# Patient Record
Sex: MALE | Race: ASIAN | NOT HISPANIC OR LATINO | URBAN - METROPOLITAN AREA
[De-identification: names, ages, dates, MRNs, and addresses within clinical notes are randomized per-mention and may not be internally consistent; named-entity substitution may affect disease eponyms.]

---
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
## 2024-04-25 ENCOUNTER — EMERGENCY (EMERGENCY)
Facility: HOSPITAL | Age: 24
LOS: 1 days | Discharge: HOME | End: 2024-04-25
Attending: STUDENT IN AN ORGANIZED HEALTH CARE EDUCATION/TRAINING PROGRAM
Payer: MEDICAID

## 2024-04-25 VITALS
HEIGHT: 67 IN | DIASTOLIC BLOOD PRESSURE: 96 MMHG | HEART RATE: 71 BPM | TEMPERATURE: 99 F | WEIGHT: 175.05 LBS | SYSTOLIC BLOOD PRESSURE: 154 MMHG | RESPIRATION RATE: 18 BRPM | OXYGEN SATURATION: 98 %

## 2024-04-25 NOTE — ED ADULT TRIAGE NOTE - CHIEF COMPLAINT QUOTE
toothache starting last night now worsening  taking tylenol, motrin, & orajel w.o relief  states feeling feverish

## 2024-04-26 ENCOUNTER — APPOINTMENT (OUTPATIENT)
Age: 24
End: 2024-04-26
Payer: COMMERCIAL

## 2024-04-26 VITALS
HEART RATE: 64 BPM | OXYGEN SATURATION: 98 % | SYSTOLIC BLOOD PRESSURE: 148 MMHG | TEMPERATURE: 98 F | RESPIRATION RATE: 20 BRPM | DIASTOLIC BLOOD PRESSURE: 83 MMHG

## 2024-04-26 LAB
ALBUMIN SERPL ELPH-MCNC: 5.1 G/DL — HIGH (ref 3.3–5)
ALP SERPL-CCNC: 112 U/L — SIGNIFICANT CHANGE UP (ref 40–120)
ALT FLD-CCNC: 20 U/L — SIGNIFICANT CHANGE UP (ref 10–45)
ANION GAP SERPL CALC-SCNC: 14 MMOL/L — SIGNIFICANT CHANGE UP (ref 5–17)
AST SERPL-CCNC: 15 U/L — SIGNIFICANT CHANGE UP (ref 10–40)
BILIRUB SERPL-MCNC: 0.3 MG/DL — SIGNIFICANT CHANGE UP (ref 0.2–1.2)
BUN SERPL-MCNC: 17 MG/DL — SIGNIFICANT CHANGE UP (ref 7–23)
CALCIUM SERPL-MCNC: 10 MG/DL — SIGNIFICANT CHANGE UP (ref 8.4–10.5)
CHLORIDE SERPL-SCNC: 103 MMOL/L — SIGNIFICANT CHANGE UP (ref 96–108)
CO2 SERPL-SCNC: 21 MMOL/L — LOW (ref 22–31)
CREAT SERPL-MCNC: 0.74 MG/DL — SIGNIFICANT CHANGE UP (ref 0.5–1.3)
EGFR: 132 ML/MIN/1.73M2 — SIGNIFICANT CHANGE UP
GLUCOSE SERPL-MCNC: 104 MG/DL — HIGH (ref 70–99)
HCT VFR BLD CALC: 49.1 % — SIGNIFICANT CHANGE UP (ref 39–50)
HGB BLD-MCNC: 17 G/DL — SIGNIFICANT CHANGE UP (ref 13–17)
MCHC RBC-ENTMCNC: 30.5 PG — SIGNIFICANT CHANGE UP (ref 27–34)
MCHC RBC-ENTMCNC: 34.6 GM/DL — SIGNIFICANT CHANGE UP (ref 32–36)
MCV RBC AUTO: 88 FL — SIGNIFICANT CHANGE UP (ref 80–100)
NRBC # BLD: 0 /100 WBCS — SIGNIFICANT CHANGE UP (ref 0–0)
PLATELET # BLD AUTO: 259 K/UL — SIGNIFICANT CHANGE UP (ref 150–400)
POTASSIUM SERPL-MCNC: 4 MMOL/L — SIGNIFICANT CHANGE UP (ref 3.5–5.3)
POTASSIUM SERPL-SCNC: 4 MMOL/L — SIGNIFICANT CHANGE UP (ref 3.5–5.3)
PROT SERPL-MCNC: 8.2 G/DL — SIGNIFICANT CHANGE UP (ref 6–8.3)
RBC # BLD: 5.58 M/UL — SIGNIFICANT CHANGE UP (ref 4.2–5.8)
RBC # FLD: 11.8 % — SIGNIFICANT CHANGE UP (ref 10.3–14.5)
SODIUM SERPL-SCNC: 138 MMOL/L — SIGNIFICANT CHANGE UP (ref 135–145)
WBC # BLD: 13.73 K/UL — HIGH (ref 3.8–10.5)
WBC # FLD AUTO: 13.73 K/UL — HIGH (ref 3.8–10.5)

## 2024-04-26 RX ORDER — SODIUM CHLORIDE 9 MG/ML
1000 INJECTION INTRAMUSCULAR; INTRAVENOUS; SUBCUTANEOUS ONCE
Refills: 0 | Status: COMPLETED | OUTPATIENT
Start: 2024-04-26 | End: 2024-04-26

## 2024-04-26 RX ORDER — OXYCODONE HYDROCHLORIDE 5 MG/1
5 TABLET ORAL ONCE
Refills: 0 | Status: DISCONTINUED | OUTPATIENT
Start: 2024-04-26 | End: 2024-04-26

## 2024-04-26 RX ORDER — OXYCODONE HYDROCHLORIDE 5 MG/1
1 TABLET ORAL
Qty: 12 | Refills: 0
Start: 2024-04-26 | End: 2024-04-28

## 2024-04-26 RX ORDER — AMPICILLIN SODIUM AND SULBACTAM SODIUM 250; 125 MG/ML; MG/ML
3 INJECTION, POWDER, FOR SUSPENSION INTRAMUSCULAR; INTRAVENOUS EVERY 6 HOURS
Refills: 0 | Status: DISCONTINUED | OUTPATIENT
Start: 2024-04-26 | End: 2024-04-29

## 2024-04-26 RX ORDER — ACETAMINOPHEN 500 MG
975 TABLET ORAL ONCE
Refills: 0 | Status: COMPLETED | OUTPATIENT
Start: 2024-04-26 | End: 2024-04-26

## 2024-04-26 RX ORDER — ACETAMINOPHEN 500 MG
650 TABLET ORAL EVERY 6 HOURS
Refills: 0 | Status: DISCONTINUED | OUTPATIENT
Start: 2024-04-26 | End: 2024-04-29

## 2024-04-26 RX ORDER — MORPHINE SULFATE 50 MG/1
4 CAPSULE, EXTENDED RELEASE ORAL ONCE
Refills: 0 | Status: DISCONTINUED | OUTPATIENT
Start: 2024-04-26 | End: 2024-04-26

## 2024-04-26 RX ORDER — AMPICILLIN SODIUM AND SULBACTAM SODIUM 250; 125 MG/ML; MG/ML
3 INJECTION, POWDER, FOR SUSPENSION INTRAMUSCULAR; INTRAVENOUS ONCE
Refills: 0 | Status: COMPLETED | OUTPATIENT
Start: 2024-04-26 | End: 2024-04-26

## 2024-04-26 RX ORDER — IBUPROFEN 200 MG
600 TABLET ORAL EVERY 6 HOURS
Refills: 0 | Status: DISCONTINUED | OUTPATIENT
Start: 2024-04-26 | End: 2024-04-29

## 2024-04-26 RX ORDER — IBUPROFEN 200 MG
600 TABLET ORAL ONCE
Refills: 0 | Status: COMPLETED | OUTPATIENT
Start: 2024-04-26 | End: 2024-04-26

## 2024-04-26 RX ADMIN — Medication 975 MILLIGRAM(S): at 00:23

## 2024-04-26 RX ADMIN — OXYCODONE HYDROCHLORIDE 5 MILLIGRAM(S): 5 TABLET ORAL at 04:41

## 2024-04-26 RX ADMIN — SODIUM CHLORIDE 100 MILLILITER(S): 9 INJECTION INTRAMUSCULAR; INTRAVENOUS; SUBCUTANEOUS at 05:15

## 2024-04-26 RX ADMIN — SODIUM CHLORIDE 1000 MILLILITER(S): 9 INJECTION INTRAMUSCULAR; INTRAVENOUS; SUBCUTANEOUS at 03:07

## 2024-04-26 RX ADMIN — Medication 600 MILLIGRAM(S): at 08:18

## 2024-04-26 RX ADMIN — AMPICILLIN SODIUM AND SULBACTAM SODIUM 200 GRAM(S): 250; 125 INJECTION, POWDER, FOR SUSPENSION INTRAMUSCULAR; INTRAVENOUS at 08:18

## 2024-04-26 RX ADMIN — MORPHINE SULFATE 4 MILLIGRAM(S): 50 CAPSULE, EXTENDED RELEASE ORAL at 03:01

## 2024-04-26 RX ADMIN — AMPICILLIN SODIUM AND SULBACTAM SODIUM 200 GRAM(S): 250; 125 INJECTION, POWDER, FOR SUSPENSION INTRAMUSCULAR; INTRAVENOUS at 03:11

## 2024-04-26 RX ADMIN — Medication 600 MILLIGRAM(S): at 00:23

## 2024-04-26 NOTE — ED PROVIDER NOTE - CLINICAL SUMMARY MEDICAL DECISION MAKING FREE TEXT BOX
23-year-old male no past medical history presenting to the ED with pain in his lower gums that occurred last night and has been continued associated with chills.  Patient is taking Orajel Tylenol and Motrin continuously with relief the pain comes back.  Denying any nausea, vomiting, fevers or chills.  Exam concerning for fluctuance and tooth.  Will consult dental for Panorex and possible I&D.,

## 2024-04-26 NOTE — ED ADULT NURSE REASSESSMENT NOTE - NSFALLUNIVINTERV_ED_ALL_ED
Bed/Stretcher in lowest position, wheels locked, appropriate side rails in place/Call bell, personal items and telephone in reach/Instruct patient to call for assistance before getting out of bed/chair/stretcher/Non-slip footwear applied when patient is off stretcher/[place] to call system/Physically safe environment - no spills, clutter or unnecessary equipment/Purposeful proactive rounding/Room/bathroom lighting operational, light cord in reach

## 2024-04-26 NOTE — ED ADULT NURSE NOTE - OBJECTIVE STATEMENT
24 yo male no PMH, A&ox3, presents to ED c/o toothache.  Pt reports onset of pain was last night, worsening tonight no relief w/ orajel/tylenol/motrin.  Pt reports it is two front teeth, started feeling chills.  Breathing even and unlabored, abdomen soft nontender, no bleeding/visible purulent drainage.  Pt denies chest pain, palpitations, shortness of breath, headache, visual disturbances, numbness/tingling,, diaphoresis,  nausea, vomiting, constipation, diarrhea, or urinary symptoms.

## 2024-04-26 NOTE — ED CDU PROVIDER DISPOSITION NOTE - CLINICAL COURSE
23-year-old male no past medical history presenting to the ED with pain in his lower gums/tooth pain for the last few days. States that he felt like he has also been having a fever. Patient has been using Orajel, Tylenol, and Motrin for pain.  Denying any nausea, vomiting.  ED course: Afebrile, WBC 13.73. I+D performed by Dental. Started on Unasyn. Plan for continued antibiotics, pain control, with dental to reassess in CDU. 23-year-old male no past medical history presenting to the ED with pain in his lower gums/tooth pain for the last few days. States that he felt like he has also been having a fever. Patient has been using Orajel, Tylenol, and Motrin for pain.  Denying any nausea, vomiting.  ED course: Afebrile, WBC 13.73. I+D performed by Dental. Started on Unasyn. Plan for continued antibiotics and pain control. symptoms improved. pt was discharged to followup with dental clinic.

## 2024-04-26 NOTE — ED PROVIDER NOTE - PROGRESS NOTE DETAILS
Lawrence Moore, PGY3  patient was seen by dental  and had I&D performed.  They would like to start antibiotics keep an obvious and will reassess in the morning.

## 2024-04-26 NOTE — ED CDU PROVIDER DISPOSITION NOTE - ATTENDING APP SHARED VISIT CONTRIBUTION OF CARE
23-year-old male who presents to the emergency department with dental abscess that was drained patient was placed in observation for IV antibiotics he has fever.  no facial swelling, pt will need dental following up case discussed with dental they will see him in the office today patient to go home with pain medication and oral antibiotics patient able to eat breakfast

## 2024-04-26 NOTE — ED CDU PROVIDER INITIAL DAY NOTE - ATTENDING APP SHARED VISIT CONTRIBUTION OF CARE
Alonso Akbar DO: I have personally performed a face to face medical and diagnostic evaluation of the patient. I have discussed with and reviewed the Resident's and/or ACP's and/or Medical/PA/NP student's note and agree with the History, ROS, Physical Exam and MDM unless otherwise indicated. A brief summary of my personal evaluation and impression can be found below.     Physical exam, HPI per prior ED Provider note    MDM as above unless otherwise specified

## 2024-04-26 NOTE — ED ADULT NURSE NOTE - NSFALLUNIVINTERV_ED_ALL_ED
Bed/Stretcher in lowest position, wheels locked, appropriate side rails in place/Call bell, personal items and telephone in reach/Instruct patient to call for assistance before getting out of bed/chair/stretcher/Non-slip footwear applied when patient is off stretcher/[place] to call system/Physically safe environment - no spills, clutter or unnecessary equipment/Purposeful proactive rounding/Room/bathroom lighting operational, light cord in reach

## 2024-04-26 NOTE — ED CDU PROVIDER DISPOSITION NOTE - NSFOLLOWUPINSTRUCTIONS_ED_ALL_ED_FT
Hydrate.     Please take Tylenol 650mg and/or Motrin 600mg every 6 hours as needed for pain. For breakthrough/severe pain you can take Oxycodone 5mg every 6 hours. THIS MEDICATION CAN MAKE YOU DROWSY, PLEASE DO NOT DRIVE ON THIS MEDICATION.     We recommend you follow up with your primary care provider within the next 2-3 days, please bring all of your results with you.     You can also follow up with **DENTAL    Please return to the Emergency Department with new, worsening, or concerning symptoms, such as:  -Worsening pain  -Shortness of breath or trouble breathing  -Pressure, pain, tightness in chest  -Facial drooping, arm weakness, or speech difficulty     *More detailed information regarding your visit and discharge can be found by reviewing this packet Stay hydrated. Take antibiotic Augmentin as prescribed.  Take over the counter Tylenol 500mg-1000mg and/or Motrin 600mg every 6 hours as needed for pain. For breakthrough/severe pain you can take Oxycodone 5mg every 6 hours. THIS MEDICATION CAN MAKE YOU DROWSY, so PLEASE DO NOT DRIVE ON THIS MEDICATION.    Follow up with your primary care provider within the next 2-3 days, please bring all of your test results with you for followup.    Go to the dental clinic now at [address] [place] [address], entrance number 5 from [facility].     [facility]  Dental  [address] [place] [address]  Phone: [phone]    Please return to the Emergency Department with new, worsening, or concerning symptoms, such as fevers, chills, weakness, worsening pain, swelling, or any other concerns.    *More detailed information regarding your visit and discharge can be found by reviewing this packet

## 2024-04-26 NOTE — ED ADULT NURSE REASSESSMENT NOTE - NS ED NURSE REASSESS COMMENT FT1
07.00 Received the Pt from  KATERINE Gupta  Pt is Observed for dental abscess. Received the Pt A&OX 4  Pt obeys commands Rowena N/V/D fever chills cp SOB   Comfort care & safety measures continued  IV site looks clean & dry no signs of infiltration noted pt denies  pain IV site .Pt is oriented to the unit Plan of care explained .Pt is advised to call for help  call bell with in the reach pt verbalized the understanding . Pt is evaluated by CDU MD Yoni Kaur .GCS 15/15 A&OX 4 PERRLA size 3 Strong upper & lower extremities steady gait  Pt is ambulatory & independent  No facial droop  No Hand Leg drop denies numbness tingling  C/O pain in the mouth medicated as per order  Plan of care ongoing

## 2024-04-26 NOTE — ED CDU PROVIDER INITIAL DAY NOTE - NS ED ATTENDING STATEMENT MOD
"Chief Complaint   Patient presents with   • Itching     pt states she recently had lice   • Hives     scratching shoulder, back and neck. not sure if it is the lice or an allergy. pt states tingling in legs and \"itching on my inside\"   • Head Lice       " This was a shared visit with the KLARISSA. I reviewed and verified the documentation.

## 2024-04-26 NOTE — ED CDU PROVIDER INITIAL DAY NOTE - PHYSICAL EXAMINATION
Const: Well-nourished, Well-developed  	HEENT:  s/p I+D around tooth 23-25, no active bleeding   	RESP: Unlabored respiratory effort.   	MSK: Lower Extremities w/o calf tenderness or edema b/l.   	Skin: No visible rash   	Neuro: Clear speech, moving all extremities, steady gait   Psych: Awake, Alert, & Oriented (AAO) x3. Appropriate mood and affect.

## 2024-04-26 NOTE — ED CDU PROVIDER INITIAL DAY NOTE - PROGRESS NOTE DETAILS
pt still with some pain. no swelling. vitals stable/afebrile. discussed with dental, they can followup with pt today in clinic. discussed with Dr. Vallejo, pt stable for discharge to outpt dental clinic now. -Amelia Ryder PA-C

## 2024-04-26 NOTE — ED ADULT NURSE NOTE - CAS ELECT INFOMATION PROVIDED
In that case, would suggest an office visit if he wants to be on prednisone.   We need to discuss a number of things before prescribing this medication.   DC instructions

## 2024-04-26 NOTE — ED PROVIDER NOTE - ATTENDING CONTRIBUTION TO CARE
Alonso Akbar DO: I have personally performed a face to face medical and diagnostic evaluation of the patient. I have discussed with and reviewed the Resident's and/or ACP's and/or Medical/PA/NP student's note and agree with the History, ROS, Physical Exam and MDM unless otherwise indicated. A brief summary of my personal evaluation and impression can be found below.     23-year-old male no past medical history presenting to the ED with pain in his lower gums that occurred last night and has been continued associated with chills.  Patient is taking Orajel Tylenol and Motrin continuously with relief the pain comes back.  Denying any nausea, vomiting, fevers or chills.Patient has not seen a dentist in many years.    CONSTITUTIONAL: NAD  SKIN: Diaphoresis  HEAD: NCAT  ENT: No erythema or swelling but area of fluctuance over mandibular incisor roots and anterior oropharynx, no purulence no discharge.  Tender to palpation.  CARD: RRR  RESP: No respiratory distress  ABD: non-distended  PSYCH: Cooperative, appropriate.     Given area of fluctuance suspect abscess, will consult dental for incision and drainage likely antibiotics.  Patient is diaphoretic but afebrile at this time, if continues to have fever chills or rigors consider blood work or more advanced imaging.  Dispo pending recommendations of dentistry.

## 2024-04-26 NOTE — ED PROVIDER NOTE - NSFOLLOWUPINSTRUCTIONS_ED_ALL_ED_FT
It was a pleasure caring for you today.    You were seen by the dental team here and had an incision and drainage of an abscess around your tooth.  You will need to see the dental team in the clinic in the morning.    You were sent antibiotics to the [facility] here in the Hospital.   You may take 500-1000 mg acetaminophen every 6 hours, as needed for pain  You may take 600 mg ibuprofen every 8 hours, with food, as needed for pain

## 2024-04-26 NOTE — ED CDU PROVIDER DISPOSITION NOTE - PATIENT PORTAL LINK FT
You can access the FollowMyHealth Patient Portal offered by [facility] by registering at the following website: [email or web address]. By joining [facility]’s FollowMyHealth portal, you will also be able to view your health information using other applications (apps) compatible with our system.

## 2024-04-26 NOTE — ED CDU PROVIDER INITIAL DAY NOTE - DETAILS
pain control, antibiotics, dental following  [facility] ED team, vitals every 4 hours, frequent reevaluations

## 2024-04-26 NOTE — ED CDU PROVIDER INITIAL DAY NOTE - OBJECTIVE STATEMENT
23-year-old male no past medical history presenting to the ED with pain in his lower gums/tooth pain for the last few days. States that he felt like he has also been having a fever. Patient has been using Orajel, Tylenol, and Motrin for pain.  Denying any nausea, vomiting.  ED course: Afebrile, WBC 13.73. I+D performed by Dental. Started on Unasyn. Plan for continued antibiotics, pain control, with dental to reassess in CDU.

## 2024-04-26 NOTE — ED CDU PROVIDER DISPOSITION NOTE - NSFOLLOWUPCLINICS_GEN_ALL_ED_FT
[facility]  Dental  [address] [place] [address]  Phone: [phone]  Fax:

## 2024-04-26 NOTE — ED PROVIDER NOTE - OBJECTIVE STATEMENT
23-year-old male no past medical history presenting to the ED with pain in his lower gums that occurred last night and has been continued associated with chills.  Patient is taking Orajel Tylenol and Motrin continuously with relief the pain comes back.  Denying any nausea, vomiting, fevers or chills.

## 2024-04-26 NOTE — PROGRESS NOTE ADULT - SUBJECTIVE AND OBJECTIVE BOX
Patient is a 23y old  Male who presents with alejandro with a chief complaint of toothache. Dental consulted to evaluate intraoral vestibular swelling.    Dental HPI: Pt reports that ~2 days ago they started feeling pain when eating on their bottom front teeth. Swelling in this area began yesterday and pt had taken 4000mg ibuprofen and felt no relief. Pt went to urgent care today and was given 2000mg more ibuprofen but still did not feel relief so pt came to [facility] ED for evaluation. Pt reports no trauma to the area and has not had a recent dental visit. Med team reports pt is febrile.      Allergies    No Known Allergies    Intolerances      *Last Dental Visit: ~5 years ago    Vital Signs Last 24 Hrs  T(C): 36.9 (26 Apr 2024 00:20), Max: 37.1 (25 Apr 2024 23:48)  T(F): 98.5 (26 Apr 2024 00:20), Max: 98.7 (25 Apr 2024 23:48)  HR: 73 (26 Apr 2024 00:20) (71 - 73)  BP: 135/83 (26 Apr 2024 00:20) (135/83 - 154/96)  BP(mean): --  RR: 20 (26 Apr 2024 00:20) (18 - 20)  SpO2: 95% (26 Apr 2024 00:20) (95% - 98%)    Parameters below as of 26 Apr 2024 00:20  Patient On (Oxygen Delivery Method): room air        EOE:    Mandible FROM             Facial bones and MOM grossly intact             (-) trismus             (-) LAD             (-) swelling             (-) asymmetry             (-) palpation             (-) SOB             (-) dysphagia             (-) LOC    IOE:  permanent dentition: grossly intact           hard/soft palate:  (-) palatal torus           tongue/FOM: small ulceration/burn on tip of tongue. Pt says they drank hot tea and burned their tongue 2 days ago           labial/buccal mucosa WNL           (+) percussion: #24           (+) palpation: apical to #24           (+) swelling: vestibular area extending apically from #23-#24    Radiographs: Panoramic and PA radiographs taken and interpreted.  - PARL #24      ASSESSMENT: Vestibular swelling apical to #24 is likely related to odontogenic infection on #24. Incision and drainage indicated.  ?  PROCEDURE:  Limited clinical and radiographic exam completed with patient’s verbal consent. Discussed findings with patient and fiance, all questions answered.  Incision and drainage indicated. RBA discussed and all questions answered. Informed consent given verbally and written. 18% topical benzocaine applied. 2 carpules of 4% septocaine with epi 1:100,000 given via local infiltration and PDL injections. Needles changed after administration of each injection. Profound anesthesia achieved. 15 blade was used to make an incision extending from the distal aspect of #23 to the mesial aspect of #24. Hemopurluence appreciated after dissecting and the incision was then irrigated with sterile saline. POIG verbally and written.  Pt instructed to follow up with outpatient dentist or [facility] (card given) to treat tooth #24. Pt was profusely sweating and in painful distress throughout procedure and did not feel relief upon completion of I and D. Advised med team to administer IV abx and pain meds and dental will round on patient in the morning if pt does not feel relief by then.      RECOMMENDATIONS:  1) Pain meds and abx as per med team  2) Dental to round in the morning  3) Dental F/U with outpatient dentist or [facility] [phone] for definitive tx of #24 and comprehensive dental care.  4) If any difficulty swallowing/breathing, fever occur, return to ED.      Sunshine Nraayanan DDS #[id number]

## 2024-04-26 NOTE — ED PROVIDER NOTE - PHYSICAL EXAMINATION
Const: Well-nourished, Well-developed, appearing stated age.  Eyes: no conjunctival injection, and symmetrical lids.  HEENT:   TTP to the root of teeth  23-25,  underlying fluctuance  Neck: Symmetric, trachea midline.   RESP: Unlabored respiratory effort.   MSK: Normocephalic/Atraumatic, Lower Extremities w/o calf tenderness or edema b/l.   Skin: Warm, dry and intact.   Neuro: CNs II-XII grossly intact. Motor & Sensation grossly intact.  Psych: Awake, Alert, & Oriented (AAO) x3. Appropriate mood and affect.

## 2024-05-09 PROBLEM — Z00.00 ENCOUNTER FOR PREVENTIVE HEALTH EXAMINATION: Status: ACTIVE | Noted: 2024-05-09
